# Patient Record
Sex: FEMALE | Race: WHITE | Employment: FULL TIME | URBAN - NONMETROPOLITAN AREA
[De-identification: names, ages, dates, MRNs, and addresses within clinical notes are randomized per-mention and may not be internally consistent; named-entity substitution may affect disease eponyms.]

---

## 2019-05-01 ENCOUNTER — OFFICE VISIT (OUTPATIENT)
Dept: PAIN MANAGEMENT | Age: 39
End: 2019-05-01
Payer: COMMERCIAL

## 2019-05-01 ENCOUNTER — TELEPHONE (OUTPATIENT)
Dept: PAIN MANAGEMENT | Age: 39
End: 2019-05-01

## 2019-05-01 VITALS
HEART RATE: 83 BPM | HEIGHT: 62 IN | DIASTOLIC BLOOD PRESSURE: 80 MMHG | RESPIRATION RATE: 16 BRPM | OXYGEN SATURATION: 97 % | SYSTOLIC BLOOD PRESSURE: 122 MMHG

## 2019-05-01 DIAGNOSIS — M54.50 LUMBAR PAIN: ICD-10-CM

## 2019-05-01 DIAGNOSIS — S32.010A CLOSED COMPRESSION FRACTURE OF FIRST LUMBAR VERTEBRA, INITIAL ENCOUNTER: Primary | ICD-10-CM

## 2019-05-01 DIAGNOSIS — M79.18 GLUTEAL PAIN: ICD-10-CM

## 2019-05-01 DIAGNOSIS — S32.009S LUMBAR TRANSVERSE PROCESS FRACTURE, SEQUELA: Primary | ICD-10-CM

## 2019-05-01 PROBLEM — S12.9XXA CLOSED FRACTURE OF TRANSVERSE PROCESS OF CERVICAL VERTEBRA (HCC): Status: ACTIVE | Noted: 2019-04-29

## 2019-05-01 PROCEDURE — 99204 OFFICE O/P NEW MOD 45 MIN: CPT | Performed by: PHYSICAL MEDICINE & REHABILITATION

## 2019-05-01 RX ORDER — PANTOPRAZOLE SODIUM 40 MG/1
40 GRANULE, DELAYED RELEASE ORAL
COMMUNITY

## 2019-05-01 RX ORDER — PAROXETINE 10 MG/1
10 TABLET, FILM COATED ORAL EVERY MORNING
COMMUNITY

## 2019-05-01 RX ORDER — HYDROCODONE BITARTRATE AND ACETAMINOPHEN 5; 325 MG/1; MG/1
1 TABLET ORAL
COMMUNITY
Start: 2019-04-29 | End: 2019-05-10 | Stop reason: SDUPTHER

## 2019-05-01 ASSESSMENT — PATIENT HEALTH QUESTIONNAIRE - PHQ9
1. LITTLE INTEREST OR PLEASURE IN DOING THINGS: 0
SUM OF ALL RESPONSES TO PHQ9 QUESTIONS 1 & 2: 0
SUM OF ALL RESPONSES TO PHQ QUESTIONS 1-9: 0
SUM OF ALL RESPONSES TO PHQ QUESTIONS 1-9: 0
2. FEELING DOWN, DEPRESSED OR HOPELESS: 0

## 2019-05-01 NOTE — LETTER
[facility]  [address] [place] [id number]  Phone: [phone]    Stefanie Boxer, MD        May 1, 2019     Patient: Nichole Reyes   YOB: 1980   Date of Visit: 5/1/2019       To Whom it May Concern:    Nichole Reyes was seen in my clinic on 5/1/2019. She may return to work on 5/15/19. If you have any questions or concerns, please don't hesitate to call.     Sincerely,         Stefanie Boxer, MD

## 2019-05-01 NOTE — PROGRESS NOTES
PAIN MANAGEMENTNEW PATIENT CONSULTATION  5/1/19    Felipe Rodas  1980    ReferringProvider:  No referring provider defined for this encounter. Primary Care Physician:  Saji Shelley MD  [facility]    HPIinformation obtained from the patient as well as the Comprehensive Pain ManagementHealth Questionnaire filled out by the patient. The questionnaire will be scannedinto the electronic chart and PMH, PSH, meds, and allergies will be updates accordingly. Subjective:       CHIEF COMPLAINT:  This is a39 y.o. female patientwho presents with a complaint of Back Pain (CT was done [facility]) and New Patient [facility] Referral, Patient fell down concerte stair, has fx at L!)      PAIN HPI:    HPI   Location: Back  Location Modifier: Left  Severity of Pain: 10  Duration of Pain: Persistent  Frequency of Pain: Constant  Aggravating Factors: -, Bending, Straightening, Standing, Walking, Stairs, Exercise, Kneeling and Squatting  Limiting Behavior: yes - . Relieving Factors: Heat, Cold and Medication -  Result of Injury: yes - . Work Related Injury: no  [place] of Worker Compensation Case: no      Prior evaluation:    Previous lower back problems:No    Previous workup: No   History of surgery in painful area:No    Previous pain medication trials have included:       Opioids: -     NSAID's:tolmetin (Tolectin)     Muscle relaxants: -     Neuropathicpain meds: -    Previous Pain Management Physician: No   Nerve blocks, spinal injections:No   Typeof Pain Intervention . Mary Castillo Date of last Pain Intervention  January /2020   Was there pain relief from Pain Intervention: No.    How long was your pain relief from the Pain Intervention 8days.      Sleep:    Sleep disturbance: Yes   Difficultyfalling asleep:  No   Feels fatiguedmuch of the time: No   Wakes uprested: No   Awakens in themiddle of the night: No   Takes sleepingmedication: No    Mental health:    Patient feels - secondary to their current pain problems as described above. H/O depression and anxiety: No   Patient is not seeing psychologist orpsychiatrist   Abuse history? No    Employed? No  Alcohol use?: No  Tobacco use?: No  Marijuana use?: No  Illicit drug use?: No    Imaging: Reviewed available imagingin our system with the patient. No results found. Referring physician records reviewed. Review of Systems    Allergies   Allergen Reactions    Gabapentin Other (See Comments)     snycope    Metronidazole Nausea And Vomiting    Penicillin V Rash       Outpatient Medications Prior to Visit   Medication Sig Dispense Refill    HYDROcodone-acetaminophen (NORCO) 5-325 MG per tablet Take 1 tablet by mouth.  PARoxetine (PAXIL) 10 MG tablet Take 10 mg by mouth every morning      pantoprazole sodium (PROTONIX) 40 MG PACK packet Take 40 mg by mouth every morning (before breakfast)       No facility-administered medications prior to visit. Past Medical History:   Diagnosis Date    Anxiety     Asthma     Fatigue     Headache     Heart burn     Hypertension     Lumbosacral disc disease     Seasonal allergic conjunctivitis        Past Surgical History:   Procedure Laterality Date    BLADDER REPAIR  2015    [facility]    HYSTERECTOMY, VAGINAL  2013    [facility]       No family history on file.   Social History     Socioeconomic History    Marital status:      Spouse name: Not on file    Number of children: Not on file    Years of education: Not on file    Highest education level: Not on file   Occupational History    Not on file   Social Needs    Financial resource strain: Not on file    Food insecurity:     Worry: Not on file     Inability: Not on file    Transportation needs:     Medical: Not on file     Non-medical: Not on file   Tobacco Use    Smoking status: Not on file   Substance and Sexual Activity    Alcohol use: Not on file    Drug use: Not on file    Sexual activity: Not on file   Lifestyle    Physical activity: Days per week: Not on file     Minutes per session: Not on file    Stress: Not on file   Relationships    Social connections:     Talks on phone: Not on file     Gets together: Not on file     Attends Jainism service: Not on file     Active member of club or organization: Not on file     Attends meetings of clubs or organizations: Not on file     Relationship status: Not on file    Intimate partner violence:     Fear of current or ex partner: Not on file     Emotionally abused: Not on file     Physically abused: Not on file     Forced sexual activity: Not on file   Other Topics Concern    Not on file   Social History Narrative    Not on file         Objective:     Physical Exam:  Vitals:    05/01/19 0939   BP: 122/80   Site: Left Upper Arm   Position: Sitting   Cuff Size: Medium Adult   Pulse: 83   Resp: 16   SpO2: 97%   Height: 5' 2\" (1.575 m)     Pain Score:  10 - Worst pain ever    Physical Exam    Ortho Exam     Labs: No results found for: WBC, HGB, HCT, PLT, CHOL, TRIG, HDL, LDLDIRECT, ALT, AST, NA, K, CL, CREATININE, BUN, CO2, TSH, PSA, INR, GLUF, LABA1C, LABMICR    Assessment: This is a 43 y.o. female with the following diagnosis:     Pain Diagnoses:  1. Closed compression fracture of first lumbar vertebra, initial encounter ([facility] [address]    2. Lumbar pain    3. Gluteal pain        Medical/ Psychological Comorbidities:  As listed in the past medical and surgical history    Functional Limitations secondary to the above problems:  Chronic painlimits function and quality of life    Plan:   Lumbar  MRI  For kyphoplasty eval  1. Tentatively schedule L1 Kyphoplasty and any other level  2. After further review of Imaging  this is a transverse process fracture not amenable to  kyphoplasty    Meds:   New Prescriptions    No medications on file      No orders of the defined types were placed in this encounter. Controlled Substances Monitoring:    OARRS report was reviewed for [place], [place].  Pt educated about the risks of taking opiates, including increasedsedation, constipation, slowed breathing, tolerance, dependence, and addiction. Orders Placed This Encounter   Procedures    MRI Lumbar Spine WO Contrast     Standing Status:   Future     Standing Expiration Date:   5/1/2020     Return in about 2 weeks (around 5/15/2019) for intervention procedure L1  kyphoplasty and  any other level  wait on MRI Lumbar . The patient expressed understanding of the above assessment and plan. Totaltime spent face to face with patient was 25 minutes inwhich  50% or more of the time was spent in counseling, education about risk andbenefits of the above plan, and coordination of care.

## 2019-05-01 NOTE — TELEPHONE ENCOUNTER
Pre-cert was not required for MRI HTB#[id number]  But Kypho was required CASE ID# [id number]  Faxed more Clinical information to [employer] at [phone]      Attempted to contact the patient to let her know that the MRI is still a go for Monday. Left  to [facility] Radiology informing about the MRI pre-cert.

## 2019-05-07 NOTE — TELEPHONE ENCOUNTER
The patient called back and stated that she had spoken with the insurance company and they told her that the kyphoplasty was precerted as a cervical procedure when it should be Lumbar at L1 and any other lever, writer informed the patient that the insurance company will be contacted to clarify the location that was requested for precert.

## 2019-05-07 NOTE — TELEPHONE ENCOUNTER
The patients precert is still pending with the insurance, writer called and asked to be transferred to a nurse reviewer and the call ended again. The patient was informed that the procedure will be canceled at [facility] until a decision has been made. Obi Graves at [facility] has been notified and stated that the OR will notify the Kypho rep.

## 2019-05-07 NOTE — TELEPHONE ENCOUNTER
Writer called the patients health plan and was informed that the precert is still pending. The representative stated that they have 15 days to reach a decision on the case then transferred writer to the nurse reviewer, Huang Vásquez. She was unavailable, LM asking that she review the case as it is scheduled for tomorrow 5/8/19. Will call back to check status in PM.     LM for the patient to call to discuss as she may need to cancel the procedure.      Informed [facility] that the PA is pending and that writer will follow up in PM

## 2019-05-07 NOTE — TELEPHONE ENCOUNTER
Pt called back and stated that she called her insurance company and was on the phone for 10 mins, they stated that we can call a review nurse at [phone] and get the decision today     Any questions please call pt back

## 2019-05-08 RX ORDER — HYDROCODONE BITARTRATE AND ACETAMINOPHEN 5; 325 MG/1; MG/1
1 TABLET ORAL
Status: CANCELLED | OUTPATIENT
Start: 2019-05-08 | End: 2019-05-15

## 2019-05-08 NOTE — TELEPHONE ENCOUNTER
Pt's sister Shedrick Burkitt called office stated that pt told her the message Luzmaria Desai had left for pt. Shedrick Burkitt stated her sister is very frustrated and doesn't want to call and talk to us. Shedrick Burkitt stated pt wants to be put on a med contract with us and wants to figure out what the process is to do so so she can get her rx. Writer notified Shedrick Burkitt that she would have to contact PCP to get a refill on Argelia Abbasi stated she knows this but wants to be on the same page with us. Please call Shedrick Burkitt back at [phone].

## 2019-05-08 NOTE — TELEPHONE ENCOUNTER
Writer called [employer]. The request is still in review, and can take up to 15 days for a decision. Writer also verified with [employer] that they are processing for [id number], which they are. Claim was started on 5/1/19 over the phone.

## 2019-05-08 NOTE — TELEPHONE ENCOUNTER
Last Appt:  05/01/2019  Next Appt: Was supposed to have a kypho today, but we are waiting on precert  Med verified in [address] checked for [place], and [place]: 05/06/2019 [place] 5/325 #8.  Give for only two days DUE NOW    04/29/2019 [place] 5/325 #28 for 7 days    04/27/2019 Percocet 5/325 #12 for one three days    04/26/2019 Percocet 5/325 #2 for one day        How would you like to proceed

## 2019-05-08 NOTE — TELEPHONE ENCOUNTER
Needs refill of norco sent to [facility] in [place]. Pt was scheduled for a procedure with Dr. Akhil Rivers today but it was cancelled due to precert not being approved. Dr. Natalya Marroquin prescribed her enough until her surgery today but since her surgery is cancelled. She is out of medication. Can a refill be put in?     Last Appt:  05/01/2019  Next Appt:     Med verified in Epic

## 2019-05-08 NOTE — TELEPHONE ENCOUNTER
Called patient and left VM for her in regards to [employer]. Writer also called Dr Tenisha Lucio to let them know.   Spoke with Juan Quiroga, at Dr Tenisha Lucio office

## 2019-05-10 ENCOUNTER — TELEPHONE (OUTPATIENT)
Dept: SURGERY | Age: 39
End: 2019-05-10

## 2019-05-10 RX ORDER — HYDROCODONE BITARTRATE AND ACETAMINOPHEN 5; 325 MG/1; MG/1
1 TABLET ORAL DAILY PRN
Qty: 30 TABLET | Refills: 0 | Status: SHIPPED | OUTPATIENT
Start: 2019-05-10 | End: 2019-06-09

## 2019-05-10 NOTE — TELEPHONE ENCOUNTER
Writer spoke with patient, she is not sure what she wants to do at this point. Patient has been off work, and is now requesting that we release her to go back, but then changed her mind and wants to talk with her supervisor first.  Dr Zurdo Diego did send in a script for Raenelle Riedel for patient for 30 days, however Dr Johnathan Ulrich office as well did send in a 14 day supply, so she is unable to get our script at the moment. Patient states that the [facility] is not helping anyway. Currently there is not a referral for Dr Andrew Harrison in the system, but Dr Zurdo Diego has stated that he would suggest that be her next step. She will call us and let us know if she wants to see him.

## 2019-05-10 NOTE — TELEPHONE ENCOUNTER
Please see note in encounter. I did  Prescribe Norco 5 #30 1 po Qd prn  Now. This is transverse  Process non displaced fracture with no  Vertebral body compression on imaging.     I recommend Dr. Edyth Peabody, Spine Surgeon to evaluate since  Healing slowly

## 2019-05-10 NOTE — TELEPHONE ENCOUNTER
Patient did not answer. Review of MRI Lumbar shows fracture limited to to L 1 transverse process fracture on  That side. It does NOT  Extend into vertebral body. It is non displaced     Typically this heals  With little residual   Back pain  Within 5 weeks. I can refer to Dr. Amy Glez Surgery  To review if patient desires. Please call patient and  I can schedule this.     I  prescribed Norco 5 QD prn #30

## 2019-05-10 NOTE — TELEPHONE ENCOUNTER
Fax came through from [phone] [facility] and the Kyphoplasty has been denied because pt had broke a bone in her back studies show that this treatment does not work well with a broken vertebra     Scanned into media tab

## 2019-05-13 ENCOUNTER — TELEPHONE (OUTPATIENT)
Dept: PAIN MANAGEMENT | Age: 39
End: 2019-05-13

## 2019-05-13 NOTE — TELEPHONE ENCOUNTER
Pt calling to inform office she has an appt set in [facility] with Dr Corrie Jacome on 6/10/19.

## 2019-05-14 NOTE — TELEPHONE ENCOUNTER
Refer to 5/10/19 encounter,  Per Britni Camarillo Pt calling to inform office she has an appt set in [facility] with Dr Jimmie Marks on 6/10/19.

## 2019-05-15 NOTE — TELEPHONE ENCOUNTER
In another note  I  Stated will not perform Kyphoplasty  And had referred to  Dr. Christa Reich Surgeon  To evaluate the transverse process fracture. Please check on this and on reason for  Prescription denial  and notify patient.

## 2020-12-02 ENCOUNTER — OFFICE VISIT (OUTPATIENT)
Dept: PRIMARY CARE CLINIC | Age: 40
End: 2020-12-02
Payer: COMMERCIAL

## 2020-12-02 ENCOUNTER — HOSPITAL ENCOUNTER (OUTPATIENT)
Age: 40
Setting detail: SPECIMEN
Discharge: HOME | End: 2020-12-02
Payer: COMMERCIAL

## 2020-12-02 VITALS
RESPIRATION RATE: 18 BRPM | BODY MASS INDEX: 26.09 KG/M2 | SYSTOLIC BLOOD PRESSURE: 108 MMHG | DIASTOLIC BLOOD PRESSURE: 68 MMHG | OXYGEN SATURATION: 96 % | HEIGHT: 62 IN | HEART RATE: 108 BPM | TEMPERATURE: 98.4 F | WEIGHT: 141.8 LBS

## 2020-12-02 PROCEDURE — 99213 OFFICE O/P EST LOW 20 MIN: CPT | Performed by: FAMILY MEDICINE

## 2020-12-02 PROCEDURE — U0003 INFECTIOUS AGENT DETECTION BY NUCLEIC ACID (DNA OR RNA); SEVERE ACUTE RESPIRATORY SYNDROME CORONAVIRUS 2 (SARS-COV-2) (CORONAVIRUS DISEASE [COVID-19]), AMPLIFIED PROBE TECHNIQUE, MAKING USE OF HIGH THROUGHPUT TECHNOLOGIES AS DESCRIBED BY CMS-2020-01-R: HCPCS

## 2020-12-02 ASSESSMENT — ENCOUNTER SYMPTOMS
DIARRHEA: 0
CONSTIPATION: 0
SINUS COMPLAINT: 1
SINUS PAIN: 1
NAUSEA: 0
EYE REDNESS: 0
VOMITING: 0
SORE THROAT: 1
RHINORRHEA: 1
WHEEZING: 0
COUGH: 1
SINUS PRESSURE: 1
ABDOMINAL PAIN: 0
HOARSE VOICE: 1
TROUBLE SWALLOWING: 0
SHORTNESS OF BREATH: 1
EYE DISCHARGE: 0

## 2020-12-02 ASSESSMENT — PATIENT HEALTH QUESTIONNAIRE - PHQ9
SUM OF ALL RESPONSES TO PHQ QUESTIONS 1-9: 2
SUM OF ALL RESPONSES TO PHQ QUESTIONS 1-9: 2
2. FEELING DOWN, DEPRESSED OR HOPELESS: 1
1. LITTLE INTEREST OR PLEASURE IN DOING THINGS: 1
SUM OF ALL RESPONSES TO PHQ QUESTIONS 1-9: 2
SUM OF ALL RESPONSES TO PHQ9 QUESTIONS 1 & 2: 2

## 2020-12-04 LAB — SARS-COV-2, NAA: NOT DETECTED
